# Patient Record
Sex: MALE | Race: WHITE | NOT HISPANIC OR LATINO | Employment: OTHER | URBAN - METROPOLITAN AREA
[De-identification: names, ages, dates, MRNs, and addresses within clinical notes are randomized per-mention and may not be internally consistent; named-entity substitution may affect disease eponyms.]

---

## 2017-01-24 ENCOUNTER — COMMUNICATION - HEALTHEAST (OUTPATIENT)
Dept: CARDIOLOGY | Facility: CLINIC | Age: 77
End: 2017-01-24

## 2017-01-24 DIAGNOSIS — I20.9 ANGINA PECTORIS (H): ICD-10-CM

## 2017-01-27 ENCOUNTER — COMMUNICATION - HEALTHEAST (OUTPATIENT)
Dept: CARDIOLOGY | Facility: CLINIC | Age: 77
End: 2017-01-27

## 2017-01-27 DIAGNOSIS — I20.9 CARDIAC ANGINA (H): ICD-10-CM

## 2017-01-27 DIAGNOSIS — I48.0 PAROXYSMAL ATRIAL FIBRILLATION (H): ICD-10-CM

## 2017-02-14 ENCOUNTER — COMMUNICATION - HEALTHEAST (OUTPATIENT)
Dept: CARDIOLOGY | Facility: CLINIC | Age: 77
End: 2017-02-14

## 2017-02-14 DIAGNOSIS — I48.91 A-FIB (H): ICD-10-CM

## 2017-07-03 ENCOUNTER — COMMUNICATION - HEALTHEAST (OUTPATIENT)
Dept: CARDIOLOGY | Facility: CLINIC | Age: 77
End: 2017-07-03

## 2017-07-03 DIAGNOSIS — I48.91 A-FIB (H): ICD-10-CM

## 2017-08-14 ENCOUNTER — AMBULATORY - HEALTHEAST (OUTPATIENT)
Dept: CARDIOLOGY | Facility: CLINIC | Age: 77
End: 2017-08-14

## 2017-08-14 DIAGNOSIS — E78.5 DYSLIPIDEMIA, GOAL LDL BELOW 70: ICD-10-CM

## 2017-08-22 ENCOUNTER — COMMUNICATION - HEALTHEAST (OUTPATIENT)
Dept: CARDIOLOGY | Facility: CLINIC | Age: 77
End: 2017-08-22

## 2017-09-05 ENCOUNTER — AMBULATORY - HEALTHEAST (OUTPATIENT)
Dept: CARDIOLOGY | Facility: CLINIC | Age: 77
End: 2017-09-05

## 2017-09-12 ENCOUNTER — OFFICE VISIT - HEALTHEAST (OUTPATIENT)
Dept: CARDIOLOGY | Facility: CLINIC | Age: 77
End: 2017-09-12

## 2017-09-12 DIAGNOSIS — I20.9 ANGINA PECTORIS (H): ICD-10-CM

## 2017-09-12 DIAGNOSIS — I48.0 PAF (PAROXYSMAL ATRIAL FIBRILLATION) (H): ICD-10-CM

## 2017-09-12 LAB
ATRIAL RATE - MUSE: 53 BPM
DIASTOLIC BLOOD PRESSURE - MUSE: NORMAL MMHG
INTERPRETATION ECG - MUSE: NORMAL
P AXIS - MUSE: 37 DEGREES
PR INTERVAL - MUSE: 162 MS
QRS DURATION - MUSE: 106 MS
QT - MUSE: 476 MS
QTC - MUSE: 446 MS
R AXIS - MUSE: 12 DEGREES
SYSTOLIC BLOOD PRESSURE - MUSE: NORMAL MMHG
T AXIS - MUSE: 23 DEGREES
VENTRICULAR RATE- MUSE: 53 BPM

## 2017-09-12 ASSESSMENT — MIFFLIN-ST. JEOR: SCORE: 1547.26

## 2017-11-13 ENCOUNTER — COMMUNICATION - HEALTHEAST (OUTPATIENT)
Dept: CARDIOLOGY | Facility: CLINIC | Age: 77
End: 2017-11-13

## 2017-11-13 DIAGNOSIS — I20.9 CARDIAC ANGINA (H): ICD-10-CM

## 2017-11-13 DIAGNOSIS — I48.0 PAROXYSMAL ATRIAL FIBRILLATION (H): ICD-10-CM

## 2017-12-04 ENCOUNTER — COMMUNICATION - HEALTHEAST (OUTPATIENT)
Dept: CARDIOLOGY | Facility: CLINIC | Age: 77
End: 2017-12-04

## 2017-12-04 DIAGNOSIS — I48.91 A-FIB (H): ICD-10-CM

## 2018-02-05 ENCOUNTER — COMMUNICATION - HEALTHEAST (OUTPATIENT)
Dept: CARDIOLOGY | Facility: CLINIC | Age: 78
End: 2018-02-05

## 2018-02-05 DIAGNOSIS — I48.91 A-FIB (H): ICD-10-CM

## 2018-06-19 ENCOUNTER — COMMUNICATION - HEALTHEAST (OUTPATIENT)
Dept: CARDIOLOGY | Facility: CLINIC | Age: 78
End: 2018-06-19

## 2018-06-19 DIAGNOSIS — I48.0 PAROXYSMAL ATRIAL FIBRILLATION (H): ICD-10-CM

## 2018-06-19 DIAGNOSIS — I20.9 CARDIAC ANGINA (H): ICD-10-CM

## 2018-09-01 ENCOUNTER — COMMUNICATION - HEALTHEAST (OUTPATIENT)
Dept: CARDIOLOGY | Facility: CLINIC | Age: 78
End: 2018-09-01

## 2018-09-01 DIAGNOSIS — I48.91 A-FIB (H): ICD-10-CM

## 2018-10-25 ENCOUNTER — COMMUNICATION - HEALTHEAST (OUTPATIENT)
Dept: CARDIOLOGY | Facility: CLINIC | Age: 78
End: 2018-10-25

## 2018-10-25 DIAGNOSIS — Z51.81 ENCOUNTER FOR MONITORING SOTALOL THERAPY: ICD-10-CM

## 2018-10-25 DIAGNOSIS — I48.0 PAF (PAROXYSMAL ATRIAL FIBRILLATION) (H): ICD-10-CM

## 2018-10-25 DIAGNOSIS — E78.5 DYSLIPIDEMIA: ICD-10-CM

## 2018-10-25 DIAGNOSIS — Z79.899 ENCOUNTER FOR MONITORING SOTALOL THERAPY: ICD-10-CM

## 2018-11-08 ENCOUNTER — COMMUNICATION - HEALTHEAST (OUTPATIENT)
Dept: CARDIOLOGY | Facility: CLINIC | Age: 78
End: 2018-11-08

## 2018-11-08 DIAGNOSIS — I48.91 A-FIB (H): ICD-10-CM

## 2018-11-19 ENCOUNTER — AMBULATORY - HEALTHEAST (OUTPATIENT)
Dept: CARDIOLOGY | Facility: CLINIC | Age: 78
End: 2018-11-19

## 2018-11-28 ENCOUNTER — OFFICE VISIT - HEALTHEAST (OUTPATIENT)
Dept: CARDIOLOGY | Facility: CLINIC | Age: 78
End: 2018-11-28

## 2018-11-28 DIAGNOSIS — I20.9 ANGINA PECTORIS (H): ICD-10-CM

## 2018-11-28 DIAGNOSIS — I48.19 PERSISTENT ATRIAL FIBRILLATION (H): ICD-10-CM

## 2018-11-28 LAB
ATRIAL RATE - MUSE: 55 BPM
DIASTOLIC BLOOD PRESSURE - MUSE: NORMAL MMHG
INTERPRETATION ECG - MUSE: NORMAL
P AXIS - MUSE: 31 DEGREES
PR INTERVAL - MUSE: 134 MS
QRS DURATION - MUSE: 108 MS
QT - MUSE: 500 MS
QTC - MUSE: 478 MS
R AXIS - MUSE: 17 DEGREES
SYSTOLIC BLOOD PRESSURE - MUSE: NORMAL MMHG
T AXIS - MUSE: 30 DEGREES
VENTRICULAR RATE- MUSE: 55 BPM

## 2018-11-28 RX ORDER — NITROGLYCERIN 0.4 MG/1
0.4 TABLET SUBLINGUAL EVERY 5 MIN PRN
Qty: 25 TABLET | Refills: 2 | Status: SHIPPED | OUTPATIENT
Start: 2018-11-28 | End: 2022-08-09

## 2018-11-28 ASSESSMENT — MIFFLIN-ST. JEOR: SCORE: 1538.19

## 2019-07-22 ENCOUNTER — COMMUNICATION - HEALTHEAST (OUTPATIENT)
Dept: CARDIOLOGY | Facility: CLINIC | Age: 79
End: 2019-07-22

## 2019-07-22 DIAGNOSIS — I48.0 PAROXYSMAL ATRIAL FIBRILLATION (H): ICD-10-CM

## 2019-07-22 DIAGNOSIS — I48.91 A-FIB (H): ICD-10-CM

## 2019-07-22 DIAGNOSIS — I20.9 CARDIAC ANGINA (H): ICD-10-CM

## 2019-11-25 ENCOUNTER — AMBULATORY - HEALTHEAST (OUTPATIENT)
Dept: CARDIOLOGY | Facility: CLINIC | Age: 79
End: 2019-11-25

## 2019-11-25 DIAGNOSIS — I48.19 PERSISTENT ATRIAL FIBRILLATION (H): ICD-10-CM

## 2019-11-25 LAB
CREAT SERPL-MCNC: 0.79 MG/DL (ref 0.7–1.3)
GFR SERPL CREATININE-BSD FRML MDRD: >60 ML/MIN/1.73M2

## 2019-11-29 ENCOUNTER — OFFICE VISIT - HEALTHEAST (OUTPATIENT)
Dept: CARDIOLOGY | Facility: CLINIC | Age: 79
End: 2019-11-29

## 2019-11-29 DIAGNOSIS — I48.19 PERSISTENT ATRIAL FIBRILLATION (H): ICD-10-CM

## 2019-11-29 DIAGNOSIS — E78.5 DYSLIPIDEMIA, GOAL LDL BELOW 70: ICD-10-CM

## 2019-12-02 LAB
ATRIAL RATE - MUSE: 53 BPM
DIASTOLIC BLOOD PRESSURE - MUSE: NORMAL
INTERPRETATION ECG - MUSE: NORMAL
P AXIS - MUSE: 21 DEGREES
PR INTERVAL - MUSE: 142 MS
QRS DURATION - MUSE: 98 MS
QT - MUSE: 486 MS
QTC - MUSE: 456 MS
R AXIS - MUSE: 28 DEGREES
SYSTOLIC BLOOD PRESSURE - MUSE: NORMAL
T AXIS - MUSE: 47 DEGREES
VENTRICULAR RATE- MUSE: 53 BPM

## 2019-12-11 ENCOUNTER — COMMUNICATION - HEALTHEAST (OUTPATIENT)
Dept: CARDIOLOGY | Facility: CLINIC | Age: 79
End: 2019-12-11

## 2019-12-11 DIAGNOSIS — I48.91 A-FIB (H): ICD-10-CM

## 2020-01-03 ENCOUNTER — COMMUNICATION - HEALTHEAST (OUTPATIENT)
Dept: CARDIOLOGY | Facility: CLINIC | Age: 80
End: 2020-01-03

## 2020-01-06 ENCOUNTER — COMMUNICATION - HEALTHEAST (OUTPATIENT)
Dept: CARDIOLOGY | Facility: CLINIC | Age: 80
End: 2020-01-06

## 2020-01-06 DIAGNOSIS — I20.9 CARDIAC ANGINA (H): ICD-10-CM

## 2020-01-06 DIAGNOSIS — I48.0 PAROXYSMAL ATRIAL FIBRILLATION (H): ICD-10-CM

## 2020-01-06 RX ORDER — AMLODIPINE BESYLATE 10 MG/1
10 TABLET ORAL DAILY
Qty: 90 TABLET | Refills: 2 | Status: SHIPPED | OUTPATIENT
Start: 2020-01-06 | End: 2022-08-09

## 2020-01-06 RX ORDER — ATORVASTATIN CALCIUM 80 MG/1
80 TABLET, FILM COATED ORAL AT BEDTIME
Qty: 90 TABLET | Refills: 2 | Status: SHIPPED | OUTPATIENT
Start: 2020-01-06

## 2020-08-12 ENCOUNTER — COMMUNICATION - HEALTHEAST (OUTPATIENT)
Dept: CARDIOLOGY | Facility: CLINIC | Age: 80
End: 2020-08-12

## 2020-08-12 DIAGNOSIS — I48.91 A-FIB (H): ICD-10-CM

## 2020-08-13 RX ORDER — SOTALOL HYDROCHLORIDE 120 MG/1
TABLET ORAL
Qty: 135 TABLET | Refills: 1 | Status: SHIPPED | OUTPATIENT
Start: 2020-08-13

## 2021-02-08 ENCOUNTER — COMMUNICATION - HEALTHEAST (OUTPATIENT)
Dept: CARDIOLOGY | Facility: CLINIC | Age: 81
End: 2021-02-08

## 2021-02-08 DIAGNOSIS — I20.9 CARDIAC ANGINA (H): ICD-10-CM

## 2021-02-08 DIAGNOSIS — I48.91 A-FIB (H): ICD-10-CM

## 2021-02-08 DIAGNOSIS — I48.0 PAROXYSMAL ATRIAL FIBRILLATION (H): ICD-10-CM

## 2021-04-28 ENCOUNTER — COMMUNICATION - HEALTHEAST (OUTPATIENT)
Dept: CARDIOLOGY | Facility: CLINIC | Age: 81
End: 2021-04-28

## 2021-05-14 ENCOUNTER — OFFICE VISIT - HEALTHEAST (OUTPATIENT)
Dept: CARDIOLOGY | Facility: CLINIC | Age: 81
End: 2021-05-14

## 2021-05-14 DIAGNOSIS — I25.9 ISCHEMIC HEART DISEASE: ICD-10-CM

## 2021-05-14 DIAGNOSIS — Z79.01 LONG TERM (CURRENT) USE OF ANTICOAGULANTS: ICD-10-CM

## 2021-05-14 DIAGNOSIS — I48.19 PERSISTENT ATRIAL FIBRILLATION (H): ICD-10-CM

## 2021-05-14 DIAGNOSIS — E78.5 DYSLIPIDEMIA, GOAL LDL BELOW 70: ICD-10-CM

## 2021-05-14 DIAGNOSIS — I48.11 LONGSTANDING PERSISTENT ATRIAL FIBRILLATION (H): ICD-10-CM

## 2021-05-14 DIAGNOSIS — I10 ESSENTIAL HYPERTENSION: ICD-10-CM

## 2021-05-14 LAB
ATRIAL RATE - MUSE: 51 BPM
DIASTOLIC BLOOD PRESSURE - MUSE: NORMAL
INTERPRETATION ECG - MUSE: NORMAL
P AXIS - MUSE: 49 DEGREES
PR INTERVAL - MUSE: 160 MS
QRS DURATION - MUSE: 104 MS
QT - MUSE: 480 MS
QTC - MUSE: 442 MS
R AXIS - MUSE: 28 DEGREES
SYSTOLIC BLOOD PRESSURE - MUSE: NORMAL
T AXIS - MUSE: 16 DEGREES
VENTRICULAR RATE- MUSE: 51 BPM

## 2021-05-14 RX ORDER — WARFARIN SODIUM 5 MG/1
5 TABLET ORAL SEE ADMIN INSTRUCTIONS
Status: SHIPPED | COMMUNITY
Start: 2021-05-14

## 2021-05-14 RX ORDER — LISINOPRIL 10 MG/1
10 TABLET ORAL AT BEDTIME
Status: SHIPPED | COMMUNITY
Start: 2021-05-14

## 2021-05-14 ASSESSMENT — MIFFLIN-ST. JEOR: SCORE: 1461.08

## 2021-05-27 VITALS
RESPIRATION RATE: 10 BRPM | SYSTOLIC BLOOD PRESSURE: 126 MMHG | BODY MASS INDEX: 20.99 KG/M2 | HEIGHT: 72 IN | HEART RATE: 53 BPM | DIASTOLIC BLOOD PRESSURE: 70 MMHG | WEIGHT: 155 LBS

## 2021-05-31 VITALS — BODY MASS INDEX: 23.57 KG/M2 | HEIGHT: 72 IN | WEIGHT: 174 LBS

## 2021-06-01 ENCOUNTER — HOSPITAL ENCOUNTER (OUTPATIENT)
Dept: CARDIOLOGY | Facility: CLINIC | Age: 81
Discharge: HOME | End: 2021-06-01
Attending: INTERNAL MEDICINE

## 2021-06-01 DIAGNOSIS — I48.11 LONGSTANDING PERSISTENT ATRIAL FIBRILLATION (H): ICD-10-CM

## 2021-06-01 DIAGNOSIS — I10 ESSENTIAL HYPERTENSION: ICD-10-CM

## 2021-06-01 LAB
AORTIC ROOT: 3.4 CM
BSA FOR ECHO PROCEDURE: 1.89 M2
CV BLOOD PRESSURE: NORMAL MMHG
CV ECHO HEIGHT: 72 IN
CV ECHO WEIGHT: 155 LBS
DOP CALC LVOT AREA: 3.46 CM2
DOP CALC LVOT DIAMETER: 2.1 CM
DOP CALC LVOT PEAK VEL: 92.4 CM/S
DOP CALC LVOT STROKE VOLUME: 85.2 CM3
DOP CALCLVOT PEAK VEL VTI: 24.6 CM
EJECTION FRACTION: 67 % (ref 55–75)
FRACTIONAL SHORTENING: 28.7 % (ref 28–44)
INTERVENTRICULAR SEPTUM IN END DIASTOLE: 0.85 CM (ref 0.6–1)
IVS/PW RATIO: 0.9
LA AREA 1: 24.3 CM2
LA AREA 2: 17.5 CM2
LEFT ATRIUM LENGTH: 5.3 CM
LEFT ATRIUM SIZE: 3.9 CM
LEFT ATRIUM TO AORTIC ROOT RATIO: 1.15 NO UNITS
LEFT ATRIUM VOLUME INDEX: 36.1 ML/M2
LEFT ATRIUM VOLUME: 68.2 ML
LEFT VENTRICLE CARDIAC INDEX: 2.5 L/MIN/M2
LEFT VENTRICLE CARDIAC OUTPUT: 4.7 L/MIN
LEFT VENTRICLE DIASTOLIC VOLUME INDEX: 48 CM3/M2 (ref 34–74)
LEFT VENTRICLE DIASTOLIC VOLUME: 90.8 CM3 (ref 62–150)
LEFT VENTRICLE HEART RATE: 55 BPM
LEFT VENTRICLE MASS INDEX: 95.3 G/M2
LEFT VENTRICLE SYSTOLIC VOLUME INDEX: 15.9 CM3/M2 (ref 11–31)
LEFT VENTRICLE SYSTOLIC VOLUME: 30 CM3 (ref 21–61)
LEFT VENTRICULAR INTERNAL DIMENSION IN DIASTOLE: 5.51 CM (ref 4.2–5.8)
LEFT VENTRICULAR INTERNAL DIMENSION IN SYSTOLE: 3.93 CM (ref 2.5–4)
LEFT VENTRICULAR MASS: 180.1 G
LEFT VENTRICULAR OUTFLOW TRACT MEAN GRADIENT: 2 MMHG
LEFT VENTRICULAR OUTFLOW TRACT MEAN VELOCITY: 64.3 CM/S
LEFT VENTRICULAR OUTFLOW TRACT PEAK GRADIENT: 3 MMHG
LEFT VENTRICULAR POSTERIOR WALL IN END DIASTOLE: 0.9 CM (ref 0.6–1)
LV STROKE VOLUME INDEX: 45.1 ML/M2
MITRAL VALVE E/A RATIO: 1
MV AVERAGE E/E' RATIO: 9.1 CM/S
MV DECELERATION TIME: 190 MS
MV E'TISSUE VEL-LAT: 8.12 CM/S
MV E'TISSUE VEL-MED: 7.54 CM/S
MV LATERAL E/E' RATIO: 8.8
MV MEDIAL E/E' RATIO: 9.5
MV PEAK A VELOCITY: 72.3 CM/S
MV PEAK E VELOCITY: 71.3 CM/S
NUC REST DIASTOLIC VOLUME INDEX: 2480 LBS
NUC REST SYSTOLIC VOLUME INDEX: 72 IN
PV ACCELERATION TIME: 201 MS
TRICUSPID REGURGITATION PEAK PRESSURE GRADIENT: 35.8 MMHG
TRICUSPID VALVE ANULAR PLANE SYSTOLIC EXCURSION: 2.6 CM
TRICUSPID VALVE PEAK REGURGITANT VELOCITY: 299 CM/S

## 2021-06-01 ASSESSMENT — MIFFLIN-ST. JEOR: SCORE: 1461.08

## 2021-06-02 VITALS — HEIGHT: 72 IN | WEIGHT: 172 LBS | BODY MASS INDEX: 23.3 KG/M2

## 2021-06-03 NOTE — PATIENT INSTRUCTIONS - HE
Harsh,    It was a pleasure to see you today at [facility].    Your creatinine level was 0.79, which is normal and indicates good kidney function.    The alternatives to Xarelto are Eliquis, Pradaxa and Savaysa. These can be prescribed by Dr. Phillips or Dr. Soto. Any of these would be better than aspirin in terms of lowering your risk of stroke.     You may call [phone] to set up an appointment with Dr. Gem Soto DPM, if she is in your network, and if your foot is still bothering you.    You will see Dr. Soto in one year after a new creatinine level.     Please call us if you have any questions or concerns about your heart.      Pierre Soto M.D.

## 2021-06-04 NOTE — TELEPHONE ENCOUNTER
----- Message from Antonietta Jaimes sent at 1/3/2020  2:02 PM CST -----  Regarding: [facility] Pt Rx  Patient has already contacted their pharmacy. The medication or refill issue is below:    Primary Cardiologist: DANY    Medication: rivaroxaban (XARELTO) 20 mg tablet    Issue / Concern: Xarelto is too expensive and the patient requests Coumadin Rx instead.     Preferred Pharmacy: [facility] - [place], [place] - [address]    Best Phone Number for Patient: [phone]    Additional Info:

## 2021-06-04 NOTE — TELEPHONE ENCOUNTER
Called patient to discuss medication concerns. He researched the cost of the 3 novel agents Dr. Soto had mentioned in clinic 11/29/19. He states they are comparable in price and he prefers coumadin for anticoagulation of choice.  Per clinic note 11/29/19:  1. He mentions that it is expensive and I gave him the names of the 3 novel oral anticoagulant alternatives which he can review with his insurance company.  Any 1 of these medications could be prescribed by myself or Dr. Phillips.  The alternatives to Xarelto are Eliquis, Pradaxa and Savaysa. These can be prescribed by Dr. Phillips or Dr. Soto. Any of these would be better than aspirin in terms of lowering your risk of stroke.     Informed patient to call his PCP for warfarin management. He verbalized understanding and has no further questions at this time.

## 2021-06-15 NOTE — TELEPHONE ENCOUNTER
PC to pt - confirmed last visit 11-29-19, overdue for yrly f/u and has [employer] insur - Rx's deferred to PCP until pt can confirm Dr. Soto covered under insur.  mg

## 2021-06-16 PROBLEM — Z79.01 LONG TERM (CURRENT) USE OF ANTICOAGULANTS: Chronic | Status: ACTIVE | Noted: 2021-04-20

## 2021-06-16 PROBLEM — G57.92 DISORDER OF PERIPHERAL NERVE OF LEFT LOWER EXTREMITY: Chronic | Status: ACTIVE | Noted: 2021-05-14

## 2021-06-17 NOTE — PATIENT INSTRUCTIONS - HE
Harsh,    It was a pleasure to see you today at [facility].    My nurse or I will call you with the echocardiogram results.    You will see Dr. Kulwant Retana in one year.    Please call us if you have any questions or concerns about your heart.      Pierre Soto M.D.  [facility]

## 2021-06-21 NOTE — LETTER
Letter by Terence Soto MD at      Author: Terence Soto MD Service: -- Author Type: --    Filed:  Encounter Date: 4/28/2021 Status: (Other)         Harsh Ken  [address]  [place] [address]      April 28, 2021      Dear Harsh,    This letter is to remind you that you are due for your follow up appointment with Dr. Terence Soto . To help ensure you are in the best health possible, a regular follow-up with your cardiologist is essential.     Please call our Patient Scheduling Line at [phone] to schedule your appointment at your earliest convenience.  If you have recently scheduled an appointment, please disregard this letter.    We look forward to seeing you again. As always, we are available at the number  above for any questions or concerns you may have.      Sincerely,     The Physicians and Staff of [facility]

## 2021-06-25 NOTE — PROGRESS NOTES
"Progress Notes by Terence Soto MD at 9/12/2017  8:10 AM     Author: Terence Soto MD Service: -- Author Type: Physician    Filed: 9/12/2017  8:45 AM Encounter Date: 9/12/2017 Status: Signed    : Terence Soto MD (Physician)           Click to link to [facility] Clinic Note      Assessment:    1. PAF (paroxysmal atrial fibrillation)  ECG Clinic Future   2. Angina pectoris  nitroglycerin (NITROSTAT) 0.4 MG SL tablet       Plan:    1.  We discussed his elevated CHADS VASC score and the role of warfarin or direct thrombin inhibitors in lowering the risk of stroke.  He does not feel the 5% annual risk of stroke is too high from his point of view and he prefers to remain on aspirin at this time.  He declined my offer to have a consultation in the atrial fibrillation clinic.   2.  For reasons that are not clear, a creatinine level was not drawn at the laboratory in [place].  He prefers to have this rechecked next year.  3.  Return to see Dr. Soto in 1 year.  I continue to recommend an EKG and creatinine level every 6 months while he is on sotalol.    An After Visit Summary was printed and given to the patient.    Subjective:    Harsh Ken returned for a planned annual follow up visit.    He has done well over the last year. He reports no cardiac symptoms and describes no difficulties with taking his cardiac medications.  He states he has not had a symptomatic episode of atrial fibrillation in more than 2 years.    He states that he does not have a regular examination with his primary care provider.  He comments \"I am old school and do not go to see the doctor if I feel well\".    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    Past Medical History:    Patient Active Problem List   Diagnosis   ? Angina Pectoris   ? Dyslipidemia, goal LDL below 70   ? Essential hypertension   ? Paroxysmal Atrial Fibrillation       Past Medical History:   Diagnosis Date   ? Coronary " Arteriosclerosis     Britney Garrett: medium sized area of inferior ischemia by nuclear stress test   ? Dyslipidemia, goal LDL below 70    ? Essential hypertension        Past Surgical History:   Procedure Laterality Date   ? APPENDECTOMY  1961        reports that he quit smoking about 37 years ago. His smoking use included Cigarettes. He has a 15.00 pack-year smoking history. He has never used smokeless tobacco. He reports that he drinks alcohol. He reports that he does not use illicit drugs.    Family History   Problem Relation Age of Onset   ? Acute Myocardial Infarction Father    ? Lung cancer Mother    ? No Medical Problems Son    ? No Medical Problems Daughter        Outpatient Encounter Prescriptions as of 9/12/2017   Medication Sig Dispense Refill   ? amLODIPine (NORVASC) 10 MG tablet TAKE 1 TABLET EVERY DAY 90 tablet 3   ? aspirin 81 MG EC tablet Take 81 mg by mouth daily.     ? atorvastatin (LIPITOR) 80 MG tablet TAKE 1 TABLET AT BEDTIME 90 tablet 3   ? nitroglycerin (NITROSTAT) 0.4 MG SL tablet Place 1 tablet (0.4 mg total) under the tongue every 5 (five) minutes as needed for chest pain. 25 tablet 2   ? sotalol (BETAPACE) 120 MG tablet TAKE 1 TABLET IN THE MORNING AND TAKE 1/2 TABLET IN THE EVENING 135 tablet 0   ? [DISCONTINUED] nitroglycerin (NITROSTAT) 0.4 MG SL tablet Place 1 tablet (0.4 mg total) under the tongue every 5 (five) minutes as needed for chest pain. 100 tablet 2     No facility-administered encounter medications on file as of 9/12/2017.        Review of Systems:     General: WNL  Eyes: WNL  Ears/Nose/Throat: WNL  Lungs: WNL  Heart: WNL  Stomach: WNL  Bladder: WNL  Muscle/Joints: WNL  Skin: WNL  Nervous System: WNL  Mental Health: WNL     Blood: WNL    Objective:     /71 (Patient Site: Right Arm, Patient Position: Sitting, Cuff Size: Adult Large)  Pulse 60  Resp 16  Ht 6' (1.829 m)  Wt 174 lb (78.9 kg)  BMI 23.6 kg/m2  Wt Readings from Last 5 Encounters:   09/12/17 174 lb (78.9  kg)   08/15/16 171 lb 9.6 oz (77.8 kg)   06/04/15 170 lb (77.1 kg)        Physical Exam:    GENERAL APPEARANCE: alert, no apparent distress  EYES: no scleral icterus  NECK: no carotid bruits or adenopathy, jugular venous pressure is less than 5 cm  CHEST: symmetric, the lungs are clear to auscultation  CARDIOVASCULAR: regular rhythm without murmur or gallop  EXTREMITIES: no cyanosis, clubbing or edema  SKIN: no xanthelasma  NEUROLOGIC: normal gait and coordination  PSYCHIATRIC: mood and affect are normal    Cardiac Testing:    EKG: Sinus bradycardia at 53 bpm with a normal corrected QT interval of 446 ms per my personal review.    Imaging:    No results found.    Lab Results:    His LDL level was 57 at the [place] laboratory on September 5, 2017.    Lab Results   Component Value Date    CREATININE 0.92 08/07/2012    BUN 20 08/07/2012     08/07/2012    K 4.7 08/07/2012     08/07/2012    CO2 25 08/07/2012     BNP (pg/mL)   Date Value   08/07/2012 36     Creatinine (mg/dL)   Date Value   08/07/2012 0.92           Much or all of the text in this note was generated through the use of the Dragon Dictate voice-to-text software. Errors in spelling or words which seem out of context are unintentional. Sound alike errors, in particular, may have escaped editing.

## 2021-06-26 NOTE — PROGRESS NOTES
Progress Notes by Terence Soto MD at 11/28/2018  1:10 PM     Author: Terence Soto MD Service: -- Author Type: Physician    Filed: 11/28/2018  1:56 PM Encounter Date: 11/28/2018 Status: Signed    : Terence Soto MD (Physician)           Click to link to [facility]     [facility] Care Clinic Note      Assessment:    1. Persistent atrial fibrillation (H)  ECG 12 lead with MUSE    Creatinine, serum       Plan:    1. He will continue to have a creatinine level and EKG done every 6 months as long as he is on sotalol.  He states he would prefer to do this at our Heart Care Clinic in either [facility] or [place].  A standing order was placed for both.  2. Return to see Dr. Soto in 1 year.    An After Visit Summary was printed and given to the patient.    Subjective:    Harsh Ken returned for a planned annual follow up visit.    He has not had any angina in the last 2 years.  He asked for a renewal of his nitroglycerin which was provided.  He has also continued to be free of atrial fibrillation.  He is very happy with the effects of the sotalol.    Harsh asked me if his atorvastatin dose could be reduced.  I did not recommend this as I told him his LDL level is very good on the current dose.    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    Past Medical History:    Patient Active Problem List   Diagnosis   ? Dyslipidemia, goal LDL below 70   ? Essential hypertension   ? Paroxysmal Atrial Fibrillation       Past Medical History:   Diagnosis Date   ? Coronary Arteriosclerosis     Britney Garrett: medium sized area of inferior ischemia by nuclear stress test   ? Dyslipidemia, goal LDL below 70    ? Essential hypertension        Past Surgical History:   Procedure Laterality Date   ? APPENDECTOMY  1961        reports that he quit smoking about 38 years ago. His smoking use included cigarettes. He has a 15.00 pack-year smoking history. he has never used smokeless  tobacco. He reports that he drinks alcohol. He reports that he does not use drugs.    Family History   Problem Relation Age of Onset   ? Acute Myocardial Infarction Father    ? Lung cancer Mother    ? No Medical Problems Son    ? No Medical Problems Daughter        Outpatient Encounter Medications as of 11/28/2018   Medication Sig Dispense Refill   ? amLODIPine (NORVASC) 10 MG tablet TAKE 1 TABLET EVERY DAY 90 tablet 2   ? aspirin 81 MG EC tablet Take 81 mg by mouth daily.     ? atorvastatin (LIPITOR) 80 MG tablet TAKE 1 TABLET AT BEDTIME 90 tablet 2   ? sotalol (BETAPACE) 120 MG tablet TAKE 1 TABLET IN THE MORNING AND TAKE 1/2 TABLET IN THE EVENING (NEED MD APPOINTMENT) 135 tablet 0   ? nitroglycerin (NITROSTAT) 0.4 MG SL tablet Place 1 tablet (0.4 mg total) under the tongue every 5 (five) minutes as needed for chest pain. 25 tablet 2   ? [DISCONTINUED] nitroglycerin (NITROSTAT) 0.4 MG SL tablet Place 1 tablet (0.4 mg total) under the tongue every 5 (five) minutes as needed for chest pain. 25 tablet 2     No facility-administered encounter medications on file as of 11/28/2018.        Review of Systems:     General: WNL  Eyes: WNL  Ears/Nose/Throat: Hearing Loss  Lungs: WNL  Heart: WNL  Stomach: WNL  Bladder: WNL  Muscle/Joints: WNL  Skin: WNL  Nervous System: WNL  Mental Health: WNL     Blood: WNL    Objective:     /78 (Patient Site: Left Arm, Patient Position: Sitting, Cuff Size: Adult Large)   Pulse 60   Resp 16   Ht 6' (1.829 m)   Wt 172 lb (78 kg)   BMI 23.33 kg/m    Wt Readings from Last 5 Encounters:   11/28/18 172 lb (78 kg)   09/12/17 174 lb (78.9 kg)   08/15/16 171 lb 9.6 oz (77.8 kg)   06/04/15 170 lb (77.1 kg)        Physical Exam:    GENERAL APPEARANCE: alert, no apparent distress  EYES: no scleral icterus  NECK: no carotid bruits or adenopathy, jugular venous pressure is less than 5 cm  CHEST: symmetric, the lungs are clear to auscultation  CARDIOVASCULAR: regular rhythm without murmur or  gallop  EXTREMITIES: no cyanosis, clubbing or edema  SKIN: no xanthelasma  NEUROLOGIC: normal gait and coordination  PSYCHIATRIC: mood and affect are normal    Cardiac Testing:    EKG: pending review     Results for orders placed during the hospital encounter of 11/13/12   NM Pharmacologic Stress Test [[id number]] 11/13/2012    Narrative See Historical Hospital Medical Record for documentation       Imaging:    No results found.    Lab Results:    Creatinine was 0.8 and LDL was 54 on November 19, 2018 at the [facility].    Lab Results   Component Value Date    CREATININE 0.92 08/07/2012    BUN 20 08/07/2012     08/07/2012    K 4.7 08/07/2012     08/07/2012    CO2 25 08/07/2012     BNP (pg/mL)   Date Value   08/07/2012 36     Creatinine (mg/dL)   Date Value   08/07/2012 0.92           Much or all of the text in this note was generated through the use of the Dragon Dictate voice-to-text software. Errors in spelling or words which seem out of context are unintentional. Sound alike errors, in particular, may have escaped editing.

## 2021-06-28 NOTE — PROGRESS NOTES
Progress Notes by Terence Soto MD at 11/29/2019 12:50 PM     Author: Terence Soto MD Service: -- Author Type: Physician    Filed: 12/2/2019 12:01 PM Encounter Date: 11/29/2019 Status: Signed    : Terence Soto MD (Physician)             Assessment:    1. Persistent atrial fibrillation  ECG 12 lead with MUSE    Creatinine, serum   2. Dyslipidemia, goal LDL below 70  LDL Cholesterol, Direct       Plan:    1. Continue current cardiovascular medical therapy.  I advised Harsh that Xarelto is an excellent alternative to aspirin to reduce his risk of cardioembolism.  It also has beneficial effects for ischemic heart disease and he should stay off aspirin as long as he is on Xarelto.  He mentions that it is expensive and I gave him the names of the 3 novel oral anticoagulant alternatives which he can review with his insurance company.  Any 1 of these medications could be prescribed by myself or Dr. Phillips.  2. I recommend repeating his EKG and creatinine level every 6 months; he is inclined to do it yearly.  He will return to see me in 1 year after a new creatinine level and direct LDL.    An After Visit Summary was printed and given to the patient.    Subjective:    Harsh Ken returned for a planned annual follow up visit. His wife, Catherine, accompanied him to the visit.    He has not had any angina pectoris or recognized episodes of atrial fibrillation since last year.  He did injure his left knee earlier this fall and ruptured a Baker's cyst.  He then had an aspiration performed.  Following that he was found to have a deep venous thrombosis that he states was below the knee.  He was put on Xarelto.  His aspirin was held.  He states he was told he would need to be on Xarelto for 60 days.  He comments that it is expensive for him.    We had a long discussion about anticoagulation, aspirin, and his cardiovascular problems.  As I have done in the remote past, I again counseled him that warfarin or  the novel agents are all superior to aspirin for the reduction of cardioembolism and stroke in patients with atrial fibrillation.  The suppression of atrial fibrillation with sotalol does not reduce his risk.  His CHADS2 VASC score is 3 for age and atherosclerosis.    Harsh feels that reducing his LDL cholesterol has been beneficial and postulates that it has reduced his plaque burden as he has not had angina pectoris for several years.    He will check with his insurance to see if any of the novel agents are covered.    Past Medical History:    Patient Active Problem List   Diagnosis   ? Dyslipidemia, goal LDL below 70   ? Essential hypertension   ? Paroxysmal Atrial Fibrillation   ? Bilateral hearing loss       Past Medical History:   Diagnosis Date   ? Deep venous thrombosis of lower leg (H) 10/2019   ? Dyslipidemia, goal LDL below 70    ? Essential hypertension    ? History of bilateral cataracts 04/30/2019   ? Ischemic heart disease 11/13/2012    medium sized area of inferior ischemia by nuclear stress test   ? Osteoarthritis of both knees        Past Surgical History:   Procedure Laterality Date   ? APPENDECTOMY  1961   ? CATARACT EXTRACTION W/  INTRAOCULAR LENS IMPLANT Bilateral 05/2019        reports that he quit smoking about 39 years ago. His smoking use included cigarettes. He has a 15.00 pack-year smoking history. He has never used smokeless tobacco. He reports current alcohol use. He reports that he does not use drugs.    Family History   Problem Relation Age of Onset   ? Acute Myocardial Infarction Father    ? Lung cancer Mother    ? No Medical Problems Son    ? No Medical Problems Daughter        Outpatient Encounter Medications as of 11/29/2019   Medication Sig Dispense Refill   ? rivaroxaban (XARELTO) 20 mg tablet Take 20 mg by mouth daily with supper.     ? amLODIPine (NORVASC) 10 MG tablet TAKE 1 TABLET EVERY DAY 90 tablet 2   ? atorvastatin (LIPITOR) 80 MG tablet TAKE 1 TABLET AT BEDTIME 90  tablet 2   ? nitroglycerin (NITROSTAT) 0.4 MG SL tablet Place 1 tablet (0.4 mg total) under the tongue every 5 (five) minutes as needed for chest pain. 25 tablet 2   ? sotalol (BETAPACE) 120 MG tablet TAKE 1 TABLET IN THE MORNING AND TAKE 1/2 TABLET IN THE EVENING  135 tablet 1   ? [DISCONTINUED] aspirin 81 MG EC tablet Take 81 mg by mouth daily.       No facility-administered encounter medications on file as of 11/29/2019.        Review of Systems:     His 12 system review was negative except as described above.    Objective:     There were no vitals taken for this visit.  Wt Readings from Last 5 Encounters:   11/28/18 172 lb (78 kg)   09/12/17 174 lb (78.9 kg)   08/15/16 171 lb 9.6 oz (77.8 kg)   06/04/15 170 lb (77.1 kg)        Physical Exam:    GENERAL APPEARANCE: alert, no apparent distress  EYES: no scleral icterus  NECK: no carotid bruits or adenopathy, jugular venous pressure is less than 5 cm  CHEST: symmetric, the lungs are clear to auscultation  CARDIOVASCULAR: regular rhythm without murmur or gallop  EXTREMITIES: no cyanosis, clubbing or edema  SKIN: no xanthelasma  NEUROLOGIC: normal gait and coordination  PSYCHIATRIC: mood and affect are normal    Cardiac Testing:    EKG: Sinus tachycardia with a single PAC and a corrected QT interval of 456 ms per my personal review.    Imaging:    No results found.    Lab Results:    BNP (pg/mL)   Date Value   08/07/2012 36     Creatinine (mg/dL)   Date Value   11/25/2019 0.79   08/07/2012 0.92           Much or all of the text in this note was generated through the use of the Dragon Dictate voice-to-text software. Errors in spelling or words which seem out of context are unintentional. Sound alike errors, in particular, may have escaped editing.

## 2021-06-30 NOTE — PROGRESS NOTES
Progress Notes by Terence Soto MD at 5/14/2021 10:10 AM     Author: Terence Soto MD Service: -- Author Type: Physician    Filed: 5/14/2021 10:51 AM Encounter Date: 5/14/2021 Status: Signed    : Terence Soto MD (Physician)             Assessment:    1. Longstanding persistent atrial fibrillation (H)  Echo Complete   2. Ischemic heart disease     3. Essential hypertension  Echo Complete   4. Dyslipidemia, goal LDL below 70     5. Long term (current) use of anticoagulants         Plan:    1. Continue current cardiovascular medications.  2. He knows that his creatinine should be checked every 6 months as long as he is on sotalol.  3. Repeat his EKG every 6 months as long as he is on sotalol.  4. Echocardiogram to reassess his cardiac structure and function in the setting of atrial fibrillation.  We will call him with the results.  5. I explained to Harsh that I will be retiring in August and our team will make arrangements for him to see my colleague, Dr. Kulwant Retana, in 1 year.    An After Visit Summary was printed and given to the patient.    Subjective:    Harsh Ken returned for a planned annual follow up visit.    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  He has lost 15 pounds of weight over the last few years, but not by intention.  He has not used any nitroglycerin in the last 5 years.  He states his prescription is active and that he does not need any other cardiovascular medication renewals today.    He does not describe chest discomfort, unusual breathlessness, episodes of atrial fibrillation, lightheadedness, syncope, orthopnea, or lower extremity edema.    Harsh tells me his wife is in good health.    Past Medical History:    Patient Active Problem List   Diagnosis   ? Dyslipidemia, goal LDL below 70   ? Essential hypertension   ? Longstanding persistent atrial fibrillation (H)   ? Bilateral hearing loss   ? 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 Long term (current) use of anticoagulants   ? Ischemic heart disease       Past Medical History:   Diagnosis Date   ? Deep venous thrombosis of lower leg (H) 10/2019   ? Dyslipidemia, goal LDL below 70    ? Essential hypertension    ? History of bilateral cataracts 2019   ? Ischemic heart disease 2012    medium sized area of inferior ischemia by nuclear stress test   ? Longstanding persistent atrial fibrillation (H)     Terence Soto: CHADS VASC score of 4 in 2017    ? Osteoarthritis of both knees        Past Surgical History:   Procedure Laterality Date   ? APPENDECTOMY     ? CATARACT EXTRACTION W/  INTRAOCULAR LENS IMPLANT Bilateral 2019        reports that he quit smoking about 40 years ago. His smoking use included cigarettes. He has a 15.00 pack-year smoking history. He has never used smokeless tobacco. He reports current alcohol use of about 7.0 standard drinks of alcohol per week. He reports that he does not use drugs.  Social History     Socioeconomic History   ? Marital status:      Spouse name: Catherine   ? Number of children: 2   ? Years of education: Not on file   ? Highest education level: Not on file   Occupational History   ? 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 Financial resource strain: Not on file   ? Food insecurity     Worry: Not on file     Inability: Not on file   ? Transportation needs     Medical: Not on file     Non-medical: Not on file   Tobacco Use   ? Smoking status: Former Smoker     Packs/day: 1.00     Years: 15.00     Pack years: 15.00     Types: Cigarettes     Quit date: 8/15/1980     Years since quittin.7   ? Smokeless tobacco: Never Used   Substance and Sexual Activity   ? Alcohol use: Yes     Alcohol/week: 7.0 standard drinks     Types: 7 Glasses of wine per week   ? Drug use: Never   ? Sexual activity: Not on file   Lifestyle   ? Physical activity     Days  per week: Not on file     Minutes per session: Not on file   ? Stress: Not on file   Relationships   ? Social connections     Talks on phone: Not on file     Gets together: Not on file     Attends [employer] service: Not on file     Active member of club or organization: Not on file     Attends meetings of clubs or organizations: Not on file     Relationship status: Not on file   ? Intimate partner violence     Fear of current or ex partner: Not on file     Emotionally abused: Not on file     Physically abused: Not on file     Forced sexual activity: Not on file   Other Topics Concern   ? Not on file   Social History Narrative   ? Not on file       Family History   Problem Relation Age of Onset   ? Acute Myocardial Infarction Father    ? Lung cancer Mother    ? No Medical Problems Son    ? No Medical Problems Daughter        Outpatient Encounter Medications as of 5/14/2021   Medication Sig Dispense Refill   ? amLODIPine (NORVASC) 10 MG tablet Take 1 tablet (10 mg total) by mouth daily. 90 tablet 2   ? atorvastatin (LIPITOR) 80 MG tablet Take 1 tablet (80 mg total) by mouth at bedtime. 90 tablet 2   ? lisinopriL (PRINIVIL,ZESTRIL) 10 MG tablet Take 10 mg by mouth at bedtime.      ? sotaloL (BETAPACE) 120 MG tablet TAKE 1 TABLET IN THE MORNING AND TAKE 1/2 TABLET IN THE EVENING  135 tablet 1   ? warfarin ANTICOAGULANT (COUMADIN/JANTOVEN) 5 MG tablet Take 5 mg by mouth See Admin Instructions.     ? nitroglycerin (NITROSTAT) 0.4 MG SL tablet Place 1 tablet (0.4 mg total) under the tongue every 5 (five) minutes as needed for chest pain. 25 tablet 2   ? [DISCONTINUED] rivaroxaban (XARELTO) 20 mg tablet Take 20 mg by mouth daily with supper.       No facility-administered encounter medications on file as of 5/14/2021.        Review of Systems:     General: WNL  Eyes: WNL  Ears/Nose/Throat: Hearing Loss  Lungs: WNL  Heart: WNL     Bladder: WNL  Muscle/Joints: WNL  Skin: WNL  Nervous System: WNL  Mental Health: WNL     Blood:  WNL    Objective:     /70 (Patient Site: Left Arm, Patient Position: Sitting, Cuff Size: Adult Regular)   Pulse (!) 53   Resp 10   Ht 6' (1.829 m)   Wt 155 lb (70.3 kg)   BMI 21.02 kg/m    Wt Readings from Last 5 Encounters:   05/14/21 155 lb (70.3 kg)   11/28/18 172 lb (78 kg)   09/12/17 174 lb (78.9 kg)   08/15/16 171 lb 9.6 oz (77.8 kg)   06/04/15 170 lb (77.1 kg)        Physical Exam:    GENERAL APPEARANCE: alert, no apparent distress  EYES: no scleral icterus  NECK: no carotid bruits or adenopathy, jugular venous pressure is less than 5 cm  CHEST: symmetric, the lungs are clear to auscultation  CARDIOVASCULAR: regular rhythm without murmur or gallop  EXTREMITIES: no cyanosis, clubbing or edema  SKIN: no xanthelasma  NEUROLOGIC: normal gait and coordination  PSYCHIATRIC: mood and affect are normal    Cardiac Testing:    EKG: Sinus bradycardia at 51 bpm with a corrected QT interval of 442 ms per my personal review.    Imaging:    No results found.    Lab Results:    Lab Results   Component Value Date    CREATININE 0.79 11/25/2019    BUN 20 08/07/2012     08/07/2012    K 4.7 08/07/2012     08/07/2012    CO2 25 08/07/2012     Harsh states his creatinine is checked twice a year and his lipid profile annually with his primary care team in [place], [place].  Those results were not available when I checked Care Everywhere today.    BNP (pg/mL)   Date Value   08/07/2012 36     Creatinine (mg/dL)   Date Value   11/25/2019 0.79   08/07/2012 0.92           Much or all of the text in this note was generated through the use of the Dragon Dictate voice-to-text software. Errors in spelling or words which seem out of context are unintentional. Sound alike errors, in particular, may have escaped editing.

## 2021-07-06 VITALS — BODY MASS INDEX: 20.99 KG/M2 | WEIGHT: 155 LBS | HEIGHT: 72 IN

## 2021-08-21 ENCOUNTER — HEALTH MAINTENANCE LETTER (OUTPATIENT)
Age: 81
End: 2021-08-21

## 2021-10-16 ENCOUNTER — HEALTH MAINTENANCE LETTER (OUTPATIENT)
Age: 81
End: 2021-10-16

## 2022-08-09 ENCOUNTER — OFFICE VISIT (OUTPATIENT)
Dept: CARDIOLOGY | Facility: CLINIC | Age: 82
End: 2022-08-09
Payer: COMMERCIAL

## 2022-08-09 VITALS
DIASTOLIC BLOOD PRESSURE: 80 MMHG | HEART RATE: 60 BPM | WEIGHT: 149 LBS | SYSTOLIC BLOOD PRESSURE: 142 MMHG | BODY MASS INDEX: 20.18 KG/M2 | RESPIRATION RATE: 16 BRPM | HEIGHT: 72 IN

## 2022-08-09 DIAGNOSIS — R94.39 ABNORMAL STRESS TEST: Primary | ICD-10-CM

## 2022-08-09 DIAGNOSIS — I25.9 ISCHEMIC HEART DISEASE: ICD-10-CM

## 2022-08-09 DIAGNOSIS — Z79.899 ENCOUNTER FOR MONITORING SOTALOL THERAPY: ICD-10-CM

## 2022-08-09 DIAGNOSIS — Z51.81 ENCOUNTER FOR MONITORING SOTALOL THERAPY: ICD-10-CM

## 2022-08-09 DIAGNOSIS — I10 ESSENTIAL HYPERTENSION: ICD-10-CM

## 2022-08-09 DIAGNOSIS — I48.19 PERSISTENT ATRIAL FIBRILLATION (H): ICD-10-CM

## 2022-08-09 DIAGNOSIS — E78.5 HYPERLIPIDEMIA, UNSPECIFIED HYPERLIPIDEMIA TYPE: ICD-10-CM

## 2022-08-09 PROCEDURE — 99214 OFFICE O/P EST MOD 30 MIN: CPT | Performed by: GENERAL ACUTE CARE HOSPITAL

## 2022-08-09 RX ORDER — LISINOPRIL 10 MG/1
10 TABLET ORAL DAILY
COMMUNITY
End: 2022-08-09

## 2022-08-09 RX ORDER — LOSARTAN POTASSIUM 50 MG/1
50 TABLET ORAL DAILY
COMMUNITY
Start: 2022-06-28

## 2022-08-09 NOTE — PATIENT INSTRUCTIONS
If you want, you can try cutting sotalol to 60 mg twice a day.  If you feel more fatigued or out of breath, we can consider having you wear a 24-hour heart monitor and repeating a stress test.  See me back in 1 year.

## 2022-08-09 NOTE — LETTER
8/9/2022    Inocente Costello MD  [address]  [place] [address]    RE: Harsh Ken       Dear Colleague,     I had the pleasure of seeing Harsh Ken in the [facility].  HEART CARE ENCOUNTER NOTE        Assessment/Recommendations   Assessment:    1. Fatigue. This is nonspecific but could be due to bradycardia from sotalol therapy or possibly progression of previously suspected coronary artery disease.  2. Persistent atrial fibrillation status post direct current cardioversion in 2004. Excellent maintenance of sinus rhythm since. [id number] score is at least 4 (hypertension, age 75 or greater, suspected coronary artery disease).  3. Encounter for monitoring sotalol therapy. He is borderline bradycardia with a normal QTc of 448 ms.  4. Suspected coronary artery disease based on abnormal nuclear stress test 11/13/2012 showing a medium-sized area of ischemia. Unclear if fatigue may be an anginal equivalent.  5. Essential hypertension. A bit elevated today but was well-controlled in his primary care physician's office.  6. Hyperlipidemia. Last LDL 58 mg/dL.    Plan:  1. We will try reducing sotalol to 60 mg twice daily to see if this improves his fatigue.  2. We also discussed a 24-hour Holter monitor as well as a pharmacologic nuclear stress test. He would like to hold on doing this for the time being.  3. Atorvastatin 80 mg daily.  4. Warfarin with goal INR 2-3.  5. Follow-up with me in 1 year.         History of Present Illness   Mr. Harsh Ken is a 81 year old male with a significant past history of persistent AF s/p DCCV in 2004 in [place], [place] maintained on sotalol and warfarin, suspected ischemic heart disease based on an abnormal nuclear stress test 11/13/2012 showing a medium-sized area of ischemia, HTN, and HLD presenting to [facility] care. He previously followed in cardiology clinic with my former colleague Dr. Terence Soto, who has since retired.    Overall he  reports doing well. He has been preoccupied recently with his wife's colon cancer diagnosis. He does feel he gets a bit more tired easily than he did a year ago. He has tried adjusting his sotalol dose in the past and when he stopped taking the second dose of the day, he felt increased palpitations. He notes his HR is mostly 50-60 no matter what he is doing.    He denies any chest pain/pressure/tightness, shortness of breath at rest or with exertion, light headedness/dizziness, pre-syncope, syncope, lower extremity swelling, palpitations, paroxysmal nocturnal dyspnea (PND), or orthopnea.    It is unclear why the patient had a stress test in 2012. He does not recall having any symptoms or being told that the test was abnormal. He never has had a coronary angiogram and has been just managed medically.     Cardiac Problems and Cardiac Diagnostics     Most Recent Cardiac testing:  ECG dated 6/6/2022 (personaly reviewed and interpreted): sinus bradycardia HR 53 bpm, QTc 448 ms otherwise normal    ECHO 6/1/2021 (report reviewed):    1.Left ventricle ejection fraction is normal. The calculated left ventricular ejection fraction is 67%.    2.TAPSE is normal, which is consistent with normal right ventricular systolic function.    3.No hemodynamically significant valvular heart abnormalities.    4.Mild pulmonary hypertension suggested.    5.No previous study for comparison.       Medications  Allergies   Current Outpatient Medications   Medication Sig Dispense Refill     atorvastatin (LIPITOR) 80 MG tablet [ATORVASTATIN (LIPITOR) 80 MG TABLET] Take 1 tablet (80 mg total) by mouth at bedtime. 90 tablet 2     lisinopriL (PRINIVIL,ZESTRIL) 10 MG tablet [LISINOPRIL (PRINIVIL,ZESTRIL) 10 MG TABLET] Take 10 mg by mouth at bedtime.        losartan (COZAAR) 50 MG tablet Take 50 mg by mouth daily       nitroglycerin (NITROSTAT) 0.4 MG SL tablet [NITROGLYCERIN (NITROSTAT) 0.4 MG SL TABLET] Place 1 tablet (0.4 mg total) under the tongue  every 5 (five) minutes as needed for chest pain. 25 tablet 2     sotaloL (BETAPACE) 120 MG tablet [SOTALOL (BETAPACE) 120 MG TABLET] TAKE 1 TABLET IN THE MORNING AND TAKE 1/2 TABLET IN THE EVENING  135 tablet 1     warfarin ANTICOAGULANT (COUMADIN/JANTOVEN) 5 MG tablet [WARFARIN ANTICOAGULANT (COUMADIN/JANTOVEN) 5 MG TABLET] Take 5 mg by mouth See Admin Instructions.        No Known Allergies     Physical Examination Review of Systems   BP (!) 142/80 (BP Location: Left arm, Patient Position: Sitting, Cuff Size: Adult Regular)   Pulse 60   Resp 16   Ht 1.829 m (6')   Wt 67.6 kg (149 lb)   BMI 20.21 kg/m    Body mass index is 20.21 kg/m .  Wt Readings from Last 3 Encounters:   08/09/22 67.6 kg (149 lb)   05/14/21 70.3 kg (155 lb)   11/28/18 78 kg (172 lb)       General Appearance:   Pleasant  male, appears  stated age. no acute distress, normal body habitus   ENT/Mouth: membranes moist, no apparent gingival bleeding.      EYES:  no scleral icterus, normal conjunctivae   Neck: no carotid bruits. No anterior cervical lymphadenopaty   Respiratory:   lungs are clear to auscultation, no rales or wheezing, equal chest wall expansion    Cardiovascular:   Regular rhythm, slow rate. Normal first and second heart sounds with no murmurs, rubs, or gallops; the carotid, radial and posterior tibial pulses are intact, Jugular venous pressure normal, no edema bilaterally    Abdomen/GI:  no organomegaly, masses, bruits, or tenderness; bowel sounds are present   Extremities: no cyanosis or clubbing   Skin: no xanthelasma, warm.    Heme/lymph/ Immunology No apparent bleeding noted.   Neurologic: Alert and oriented. normal gait, no tremors     Psychiatric: Pleasant, calm, appropriate affect.    A complete 10 system review of systems was performed and is negative except as mentioned in the HPI/subjective.         Past History   Past Medical History:   Past Medical History:   Diagnosis Date     Bilateral cataracts 04/30/2019     Deep  venous thrombosis of lower leg (H) 10/2019     Dyslipidemia, goal LDL below 70      Essential hypertension      Ischemic heart disease 2012    medium sized area of inferior ischemia by nuclear stress test     Longstanding persistent atrial fibrillation (H)     Terence Soto: CHADS VASC score of 4 in 2017      Osteoarthritis of both knees        Past Surgical History:   Past Surgical History:   Procedure Laterality Date     APPENDECTOMY       PHACOEMULSIFICATION CLEAR CORNEA WITH STANDARD INTRAOCULAR LENS IMPLANT Bilateral 2019       Family History:   Family History   Problem Relation Age of Onset     Acute Myocardial Infarction Father      Lung Cancer Mother      No Known Problems Son      No Known Problems Daughter        Social History:   Social History     Socioeconomic History     Marital status:      Spouse name: Not on file     Number of children: 2     Years of education: Not on file     Highest education level: Not on file   Occupational History     Not on file   Tobacco Use     Smoking status: Former Smoker     Packs/day: 1.00     Years: 15.00     Pack years: 15.00     Types: Cigarettes, Cigarettes     Quit date: 8/15/1980     Years since quittin.0     Smokeless tobacco: Never Used   Substance and Sexual Activity     Alcohol use: Yes     Alcohol/week: 7.0 standard drinks     Drug use: Never     Sexual activity: Not on file   Other Topics Concern     Not on file   Social History Narrative     Not on file     Social Determinants of Health     Financial Resource Strain: Not on file   Food Insecurity: Not on file   Transportation Needs: Not on file   Physical Activity: Not on file   Stress: Not on file   Social Connections: Not on file   Intimate Partner Violence: Not on file   Housing Stability: Not on file              Lab Results    Chemistry/lipid CBC Cardiac Enzymes/BNP/TSH/INR   2022  Na 138  K 4.3  Cl 104  CO2 28  Glucose 89  Creatinine 0.8  Calcium 8.7    Total cholesterol  127   HDL 47  LDL 58  Triglycerides 110   6/6/2022  WBC 6.14k  HgB 13.7  Hematocrit 42.9  Platelet 230k        Kulwant Retana MD [facility]  Non-Invasive Cardiologist  [facility]  Pager [phone]    Thank you for allowing me to participate in the care of your patient.      Sincerely,     Kulwant Retana MD     [facility]  cc:

## 2022-08-09 NOTE — PROGRESS NOTES
HEART CARE ENCOUNTER NOTE        Assessment/Recommendations   Assessment:    1. Fatigue. This is nonspecific but could be due to bradycardia from sotalol therapy or possibly progression of previously suspected coronary artery disease.  2. Persistent atrial fibrillation status post direct current cardioversion in 2004. Excellent maintenance of sinus rhythm since. [id number] score is at least 4 (hypertension, age 75 or greater, suspected coronary artery disease).  3. Encounter for monitoring sotalol therapy. He is borderline bradycardia with a normal QTc of 448 ms.  4. Suspected coronary artery disease based on abnormal nuclear stress test 11/13/2012 showing a medium-sized area of ischemia. Unclear if fatigue may be an anginal equivalent.  5. Essential hypertension. A bit elevated today but was well-controlled in his primary care physician's office.  6. Hyperlipidemia. Last LDL 58 mg/dL.    Plan:  1. We will try reducing sotalol to 60 mg twice daily to see if this improves his fatigue.  2. We also discussed a 24-hour Holter monitor as well as a pharmacologic nuclear stress test. He would like to hold on doing this for the time being.  3. Atorvastatin 80 mg daily.  4. Warfarin with goal INR 2-3.  5. Follow-up with me in 1 year.         History of Present Illness   Mr. Harsh Ken is a 81 year old male with a significant past history of persistent AF s/p DCCV in 2004 in [place], [place] maintained on sotalol and warfarin, suspected ischemic heart disease based on an abnormal nuclear stress test 11/13/2012 showing a medium-sized area of ischemia, HTN, and HLD presenting to hospitals care. He previously followed in cardiology clinic with my former colleague Dr. Terence Soto, who has since retired.    Overall he reports doing well. He has been preoccupied recently with his wife's colon cancer diagnosis. He does feel he gets a bit more tired easily than he did a year ago. He has tried adjusting his sotalol  dose in the past and when he stopped taking the second dose of the day, he felt increased palpitations. He notes his HR is mostly 50-60 no matter what he is doing.    He denies any chest pain/pressure/tightness, shortness of breath at rest or with exertion, light headedness/dizziness, pre-syncope, syncope, lower extremity swelling, palpitations, paroxysmal nocturnal dyspnea (PND), or orthopnea.    It is unclear why the patient had a stress test in 2012. He does not recall having any symptoms or being told that the test was abnormal. He never has had a coronary angiogram and has been just managed medically.     Cardiac Problems and Cardiac Diagnostics     Most Recent Cardiac testing:  ECG dated 6/6/2022 (personaly reviewed and interpreted): sinus bradycardia HR 53 bpm, QTc 448 ms otherwise normal    ECHO 6/1/2021 (report reviewed):    1.Left ventricle ejection fraction is normal. The calculated left ventricular ejection fraction is 67%.    2.TAPSE is normal, which is consistent with normal right ventricular systolic function.    3.No hemodynamically significant valvular heart abnormalities.    4.Mild pulmonary hypertension suggested.    5.No previous study for comparison.       Medications  Allergies   Current Outpatient Medications   Medication Sig Dispense Refill     atorvastatin (LIPITOR) 80 MG tablet [ATORVASTATIN (LIPITOR) 80 MG TABLET] Take 1 tablet (80 mg total) by mouth at bedtime. 90 tablet 2     lisinopriL (PRINIVIL,ZESTRIL) 10 MG tablet [LISINOPRIL (PRINIVIL,ZESTRIL) 10 MG TABLET] Take 10 mg by mouth at bedtime.        losartan (COZAAR) 50 MG tablet Take 50 mg by mouth daily       nitroglycerin (NITROSTAT) 0.4 MG SL tablet [NITROGLYCERIN (NITROSTAT) 0.4 MG SL TABLET] Place 1 tablet (0.4 mg total) under the tongue every 5 (five) minutes as needed for chest pain. 25 tablet 2     sotaloL (BETAPACE) 120 MG tablet [SOTALOL (BETAPACE) 120 MG TABLET] TAKE 1 TABLET IN THE MORNING AND TAKE 1/2 TABLET IN THE EVENING   135 tablet 1     warfarin ANTICOAGULANT (COUMADIN/JANTOVEN) 5 MG tablet [WARFARIN ANTICOAGULANT (COUMADIN/JANTOVEN) 5 MG TABLET] Take 5 mg by mouth See Admin Instructions.        No Known Allergies     Physical Examination Review of Systems   BP (!) 142/80 (BP Location: Left arm, Patient Position: Sitting, Cuff Size: Adult Regular)   Pulse 60   Resp 16   Ht 1.829 m (6')   Wt 67.6 kg (149 lb)   BMI 20.21 kg/m    Body mass index is 20.21 kg/m .  Wt Readings from Last 3 Encounters:   08/09/22 67.6 kg (149 lb)   05/14/21 70.3 kg (155 lb)   11/28/18 78 kg (172 lb)       General Appearance:   Pleasant  male, appears  stated age. no acute distress, normal body habitus   ENT/Mouth: membranes moist, no apparent gingival bleeding.      EYES:  no scleral icterus, normal conjunctivae   Neck: no carotid bruits. No anterior cervical lymphadenopaty   Respiratory:   lungs are clear to auscultation, no rales or wheezing, equal chest wall expansion    Cardiovascular:   Regular rhythm, slow rate. Normal first and second heart sounds with no murmurs, rubs, or gallops; the carotid, radial and posterior tibial pulses are intact, Jugular venous pressure normal, no edema bilaterally    Abdomen/GI:  no organomegaly, masses, bruits, or tenderness; bowel sounds are present   Extremities: no cyanosis or clubbing   Skin: no xanthelasma, warm.    Heme/lymph/ Immunology No apparent bleeding noted.   Neurologic: Alert and oriented. normal gait, no tremors     Psychiatric: Pleasant, calm, appropriate affect.    A complete 10 system review of systems was performed and is negative except as mentioned in the HPI/subjective.         Past History   Past Medical History:   Past Medical History:   Diagnosis Date     Bilateral cataracts 04/30/2019     Deep venous thrombosis of lower leg (H) 10/2019     Dyslipidemia, goal LDL below 70      Essential hypertension      Ischemic heart disease 11/13/2012    medium sized area of inferior ischemia by  nuclear stress test     Longstanding persistent atrial fibrillation (H)     Terence Soto: CHADS VASC score of 4 in 2017      Osteoarthritis of both knees        Past Surgical History:   Past Surgical History:   Procedure Laterality Date     APPENDECTOMY       PHACOEMULSIFICATION CLEAR CORNEA WITH STANDARD INTRAOCULAR LENS IMPLANT Bilateral 2019       Family History:   Family History   Problem Relation Age of Onset     Acute Myocardial Infarction Father      Lung Cancer Mother      No Known Problems Son      No Known Problems Daughter        Social History:   Social History     Socioeconomic History     Marital status:      Spouse name: Not on file     Number of children: 2     Years of education: Not on file     Highest education level: Not on file   Occupational History     Not on file   Tobacco Use     Smoking status: Former Smoker     Packs/day: 1.00     Years: 15.00     Pack years: 15.00     Types: Cigarettes, Cigarettes     Quit date: 8/15/1980     Years since quittin.0     Smokeless tobacco: Never Used   Substance and Sexual Activity     Alcohol use: Yes     Alcohol/week: 7.0 standard drinks     Drug use: Never     Sexual activity: Not on file   Other Topics Concern     Not on file   Social History Narrative     Not on file     Social Determinants of Health     Financial Resource Strain: Not on file   Food Insecurity: Not on file   Transportation Needs: Not on file   Physical Activity: Not on file   Stress: Not on file   Social Connections: Not on file   Intimate Partner Violence: Not on file   Housing Stability: Not on file              Lab Results    Chemistry/lipid CBC Cardiac Enzymes/BNP/TSH/INR   2022  Na 138  K 4.3  Cl 104  CO2 28  Glucose 89  Creatinine 0.8  Calcium 8.7    Total cholesterol 127   HDL 47  LDL 58  Triglycerides 110   2022  WBC 6.14k  HgB 13.7  Hematocrit 42.9  Platelet 230k        Kulwant Retana MD [facility]  Non-Invasive Cardiologist  [facility]  Care  Pager [phone]

## 2022-09-25 ENCOUNTER — HEALTH MAINTENANCE LETTER (OUTPATIENT)
Age: 82
End: 2022-09-25

## 2023-10-14 ENCOUNTER — HEALTH MAINTENANCE LETTER (OUTPATIENT)
Age: 83
End: 2023-10-14

## 2024-09-13 ENCOUNTER — TRANSFERRED RECORDS (OUTPATIENT)
Dept: HEALTH INFORMATION MANAGEMENT | Facility: CLINIC | Age: 84
End: 2024-09-13

## 2024-12-07 ENCOUNTER — HEALTH MAINTENANCE LETTER (OUTPATIENT)
Age: 84
End: 2024-12-07

## 2025-04-21 ENCOUNTER — TRANSFERRED RECORDS (OUTPATIENT)
Dept: HEALTH INFORMATION MANAGEMENT | Facility: CLINIC | Age: 85
End: 2025-04-21
Payer: COMMERCIAL

## 2025-04-28 ENCOUNTER — TRANSFERRED RECORDS (OUTPATIENT)
Dept: HEALTH INFORMATION MANAGEMENT | Facility: CLINIC | Age: 85
End: 2025-04-28
Payer: COMMERCIAL

## 2025-05-06 ENCOUNTER — TRANSFERRED RECORDS (OUTPATIENT)
Dept: HEALTH INFORMATION MANAGEMENT | Facility: CLINIC | Age: 85
End: 2025-05-06
Payer: COMMERCIAL

## 2025-05-06 ENCOUNTER — MEDICAL CORRESPONDENCE (OUTPATIENT)
Dept: HEALTH INFORMATION MANAGEMENT | Facility: CLINIC | Age: 85
End: 2025-05-06
Payer: COMMERCIAL

## 2025-07-19 ENCOUNTER — ORDERS ONLY (AUTO-RELEASED) (OUTPATIENT)
Dept: CARDIOLOGY | Facility: CLINIC | Age: 85
End: 2025-07-19
Payer: COMMERCIAL

## 2025-07-19 DIAGNOSIS — I48.11 LONGSTANDING PERSISTENT ATRIAL FIBRILLATION (H): Chronic | ICD-10-CM
